# Patient Record
Sex: MALE | Race: WHITE | Employment: FULL TIME | URBAN - NONMETROPOLITAN AREA
[De-identification: names, ages, dates, MRNs, and addresses within clinical notes are randomized per-mention and may not be internally consistent; named-entity substitution may affect disease eponyms.]

---

## 2020-01-17 NOTE — PROGRESS NOTES
"Subjective     Steven Blandon is a 24 year old male who presents to clinic today for the following health issues:    HPI   New Patient/Transfer of Care  Hernia      Duration: lifting lights weights last Monday felt a burning, zipper sensation in his lower right abd. He was doing dead lifts at the time    Description (location/character/radiation): lower right abdomen    Intensity:  mild    Accompanying signs and symptoms: Burning feeling, \"zipper burning\". Symptoms resolved.     History (similar episodes/previous evaluation): None    Precipitating or alleviating factors: Lifting weights    Therapies tried and outcome: None    No h/o hernia    Not lifting weights since then    Does not feel bulge       There is no problem list on file for this patient.    Past Surgical History:   Procedure Laterality Date     NO HISTORY OF SURGERY         Social History     Tobacco Use     Smoking status: Never Smoker     Smokeless tobacco: Never Used   Substance Use Topics     Alcohol use: Yes     Frequency: 2-4 times a month     Family History   Problem Relation Age of Onset     Breast Cancer Maternal Grandmother      Breast Cancer Maternal Aunt          No current outpatient medications on file.     Allergies   Allergen Reactions     Peanuts [Nuts]      Tingly feeling on lips and mouth     BP Readings from Last 3 Encounters:   01/21/20 106/62    Wt Readings from Last 3 Encounters:   01/21/20 83.7 kg (184 lb 9.6 oz)                    Reviewed and updated as needed this visit by Provider  Tobacco  Allergies  Meds  Problems  Med Hx  Surg Hx  Fam Hx  Soc Hx          Review of Systems   Constitutional: Negative for chills and fever.   HENT: Negative for congestion, ear pain, hearing loss and sore throat.    Eyes: Negative for pain and visual disturbance.   Respiratory: Negative for cough and shortness of breath.    Cardiovascular: Negative for chest pain, palpitations and peripheral edema.   Gastrointestinal: Negative for " "abdominal pain, constipation, diarrhea, heartburn, hematochezia and nausea.   Genitourinary: Negative for dysuria, frequency, hematuria and urgency.   Musculoskeletal: Negative for arthralgias, joint swelling and myalgias.   Skin: Negative for rash.   Neurological: Negative for dizziness, weakness, headaches and paresthesias.   Psychiatric/Behavioral: Negative for mood changes. The patient is not nervous/anxious.             Objective    /62 (BP Location: Left arm, Patient Position: Chair, Cuff Size: Adult Regular)   Pulse 58   Temp 96.2  F (35.7  C) (Tympanic)   Ht 1.632 m (5' 4.25\")   Wt 83.7 kg (184 lb 9.6 oz)   SpO2 97%   BMI 31.44 kg/m    Body mass index is 31.44 kg/m .  Physical Exam  Constitutional:       General: He is not in acute distress.     Appearance: He is well-developed.   HENT:      Head: Normocephalic and atraumatic.      Right Ear: Tympanic membrane normal.      Left Ear: Tympanic membrane normal.      Mouth/Throat:      Mouth: Mucous membranes are moist.      Pharynx: No oropharyngeal exudate.   Eyes:      Extraocular Movements: Extraocular movements intact.      Conjunctiva/sclera: Conjunctivae normal.      Pupils: Pupils are equal, round, and reactive to light.   Neck:      Musculoskeletal: Normal range of motion and neck supple.      Thyroid: No thyromegaly.   Cardiovascular:      Rate and Rhythm: Normal rate and regular rhythm.      Pulses: Normal pulses.      Heart sounds: No murmur.   Pulmonary:      Effort: Pulmonary effort is normal. No respiratory distress.      Breath sounds: No wheezing or rales.   Abdominal:      General: Bowel sounds are normal. There is no distension.      Palpations: Abdomen is soft.      Tenderness: There is no abdominal tenderness. There is no guarding.      Hernia: No hernia is present.   Musculoskeletal: Normal range of motion.   Lymphadenopathy:      Cervical: No cervical adenopathy.   Skin:     General: Skin is warm and dry.   Neurological:      " Mental Status: He is alert.      Deep Tendon Reflexes: Reflexes normal.   Psychiatric:         Mood and Affect: Mood normal.        Diagnostic Test Results: none         Assessment & Plan     1. Abdominal pain, right lower quadrant  Concern for hernia vs muscle strain   - US Abdomen Limited; Future  - US Testicular and Scrotum; Future    2. Need for vaccination    3. Need for prophylactic vaccination and inoculation against influenza  - TDAP VACCINE (ADACEL) [41036.002]  - 1st  Administration  [26636]  - INFLUENZA VACCINE IM > 6 MONTHS VALENT IIV4 [09606]  - Vaccine Administration, Initial [44935]       See Patient Instructions    Return in about 1 year (around 1/21/2021), or if symptoms worsen or fail to improve.    Padmini Luciano MD  [facility] - \[facility]\""[place]

## 2020-01-21 ENCOUNTER — OFFICE VISIT (OUTPATIENT)
Dept: FAMILY MEDICINE | Facility: OTHER | Age: 25
End: 2020-01-21
Attending: FAMILY MEDICINE
Payer: COMMERCIAL

## 2020-01-21 VITALS
BODY MASS INDEX: 31.51 KG/M2 | WEIGHT: 184.6 LBS | TEMPERATURE: 96.2 F | HEIGHT: 64 IN | DIASTOLIC BLOOD PRESSURE: 62 MMHG | OXYGEN SATURATION: 97 % | SYSTOLIC BLOOD PRESSURE: 106 MMHG | HEART RATE: 58 BPM

## 2020-01-21 DIAGNOSIS — R10.31 ABDOMINAL PAIN, RIGHT LOWER QUADRANT: Primary | ICD-10-CM

## 2020-01-21 DIAGNOSIS — Z23 NEED FOR PROPHYLACTIC VACCINATION AND INOCULATION AGAINST INFLUENZA: ICD-10-CM

## 2020-01-21 DIAGNOSIS — Z23 NEED FOR VACCINATION: ICD-10-CM

## 2020-01-21 PROCEDURE — 90471 IMMUNIZATION ADMIN: CPT | Performed by: FAMILY MEDICINE

## 2020-01-21 PROCEDURE — 90686 IIV4 VACC NO PRSV 0.5 ML IM: CPT | Performed by: FAMILY MEDICINE

## 2020-01-21 PROCEDURE — 90715 TDAP VACCINE 7 YRS/> IM: CPT | Performed by: FAMILY MEDICINE

## 2020-01-21 PROCEDURE — 99203 OFFICE O/P NEW LOW 30 MIN: CPT | Mod: 25 | Performed by: FAMILY MEDICINE

## 2020-01-21 PROCEDURE — 90472 IMMUNIZATION ADMIN EACH ADD: CPT | Performed by: FAMILY MEDICINE

## 2020-01-21 SDOH — HEALTH STABILITY: MENTAL HEALTH: HOW OFTEN DO YOU HAVE A DRINK CONTAINING ALCOHOL?: 2-4 TIMES A MONTH

## 2020-01-21 ASSESSMENT — ENCOUNTER SYMPTOMS
PALPITATIONS: 0
ABDOMINAL PAIN: 0
WEAKNESS: 0
MYALGIAS: 0
EYE PAIN: 0
CONSTIPATION: 0
PARESTHESIAS: 0
NERVOUS/ANXIOUS: 0
SHORTNESS OF BREATH: 0
JOINT SWELLING: 0
FEVER: 0
SORE THROAT: 0
DIZZINESS: 0
ARTHRALGIAS: 0
CHILLS: 0
HEMATURIA: 0
DYSURIA: 0
HEADACHES: 0
FREQUENCY: 0
HEARTBURN: 0
COUGH: 0
HEMATOCHEZIA: 0
NAUSEA: 0
DIARRHEA: 0

## 2020-01-21 ASSESSMENT — PAIN SCALES - GENERAL: PAINLEVEL: NO PAIN (0)

## 2020-01-21 ASSESSMENT — MIFFLIN-ST. JEOR: SCORE: 1737.31

## 2020-01-21 NOTE — NURSING NOTE
"Chief Complaint   Patient presents with     Establish Care     Imm/Inj     Flu Shot       Initial /62 (BP Location: Left arm, Patient Position: Chair, Cuff Size: Adult Regular)   Pulse 58   Temp 96.2  F (35.7  C) (Tympanic)   Ht 1.632 m (5' 4.25\")   Wt 83.7 kg (184 lb 9.6 oz)   SpO2 97%   BMI 31.44 kg/m   Estimated body mass index is 31.44 kg/m  as calculated from the following:    Height as of this encounter: 1.632 m (5' 4.25\").    Weight as of this encounter: 83.7 kg (184 lb 9.6 oz).  Medication Reconciliation: complete  Ivet Malagon LPN    "

## 2020-01-24 ENCOUNTER — TELEPHONE (OUTPATIENT)
Dept: FAMILY MEDICINE | Facility: OTHER | Age: 25
End: 2020-01-24